# Patient Record
(demographics unavailable — no encounter records)

---

## 2025-05-29 NOTE — PHYSICAL EXAM
[Antalgic] : antalgic [de-identified] : General: No acute distress Mental: Alert and oriented x3 Eyes: Conjunctivitis not seen Chest: Symmetric chest rise, no audible wheezing Skin: Bilateral lower extremities absent from rashes and ulcers Abdomen: No distention  Right hip: Skin: Clean, dry and intact Inspection: No obvious deformity, no swelling, no ecchymosis. Tenderness: no tenderness over greater trochanter/gluteus medius insertion. No tenderness pubic symphysis, pubic tubercle, hip flexors. No tenderness ischial tuberosity or buttock. Nontender over the ASIS/Illiac crest. ROM: 0-120. Internal rotation 20, external rotation 40 Special tests: Positive Stinchfield, positive FADIR, positive PAGE, negative logroll Additional tests: No pain with circumduction, negative impingement test at 90 Strength: 5/5 hip flexion/Abduction/Q/H/TA/GS/EHL Neuro: Sensation intact to light touch throughout in dp/sp/tib/brandon/saph distributions Pulses: 2+ DP/PT pulses [de-identified] : Pelvis and right hip x-ray shows severe joint space narrowing, asymmetry and mild deformity of the femoral head, subchondral sclerosis and subchondral cyst, small osteophytes.  MRI right hip on 5/22/2025 at [facility] reviewed: Avascular necrosis and marrow edema at the femoral head and neck.

## 2025-05-29 NOTE — HISTORY OF PRESENT ILLNESS
[de-identified] : 78-year-old male with approximately 2 months of right hip pain.  Denies any injuries.  Reports medial groin pain radiating to the thigh and knee. The pain substantially limits activities of daily living. Walking tolerance is reduced. Medication including NSAIDs such as meloxicam, and activity modification have been minimally effective. Assistive devices and external support were not deemed by the patient to be helpful in improving their function.  He started physical therapy which caused more pain.  The patient denies any radiation of the pain to the feet and it is not associated with numbness, tingling, or weakness.  He was previously prescribed meloxicam.  He smokes 1 pack of cigarettes per day for many years. PMH: CAD with stents, hypertension, hyperlipidemia, extensive smoking history

## 2025-05-29 NOTE — DISCUSSION/SUMMARY
[de-identified] : The patient presents with severe RIGHT hip osteoarthritis and avascular necrosis. Based upon the patient's continued symptoms and failure to respond to conservative treatment, I have recommended a RIGHT total hip replacement for the patient. An extensive discussion was conducted of the natural history of the arthritis and the variety of surgical and non-surgical treatment options available to the patient. A risk/benefit analysis was discussed with the patient reviewing the advantages and disadvantages of surgical intervention at this time. One or more of the following conservative treatments have been tried and failed for 3 months or more: analgesic medication; home exercises; physical therapy; brace; cortisone injections; anti-inflammatory medication. Both the level and length of the patient's pain have made additional conservative treatment measures contraindicated. A full explanation was given of the nature and the purpose of the procedure and anesthesia, its benefits, possible alternative methods of diagnosis or treatment, the risks involved, the possibility of complications, the foreseeable consequences of the procedure and the possible results of the non-treatment.    The patient has arthritis/ end stage joint disease of the hip supported by x-ray that demonstrated at least one of the following: periarticular osteophytes; severe joint space narrowing; subchondral cysts; subchondral sclerosis; bone on bone articulation.   A discussion was had with the patient regarding a total hip replacement. Anterior and posterior exposures were discussed. For this patient an Anterior approach is appropriate. A model was used to demonstrate the operation and to discuss bearing surfaces of the implants, as well as implant choices and fixation with shared decision making. The hospitalization and rehabilitation were discussed. The use of perioperative antibiotics and DVT prophylaxis were discussed. No guarantee or assurance was made as to the results that may be obtained. The risks, benefits and alternatives to surgical intervention were discussed at length with the patient. The potential biologic and mechanical risks of the procedure were discussed. Specifically the risks were identified to include, but are not limited to the following: Infection, phlebitis, DVT, pulmonary embolism, death, paralysis, dislocation, pain, stiffness, instability, limp, weakness, breakage, leg-length inequality, uncontrolled bleeding, nerve injury, blood vessel injury, pressure sores, anesthetic risks, delayed healing of wound and bone, wear and loosening, and need for additional surgery.  The patient was told that we will take steps to minimize these risks by using sterile technique, antibiotics and DVT prophylaxis when appropriate and following the patient postoperatively in the clinic setting to monitor progress. The benefits of surgery were discussed with the patient including the potential to improve the current clinical condition through operative intervention. Alternatives to surgical intervention include continued conservative management which may yield less than optimal results in this particular patient. Further discussion was undertaken with the patient about the details of surgical preparation, treatment, and postoperative rehabilitation including medical clearance, autotransfusion, the hospital course, and the postoperative rehabilitation involved. Postoperative hospital time is important to observe for urinary retention, neurologic decline, cardiopulmonary issues, and signs of blood clot which are frequent early complications of joint replacements. This time will also be used to work with PT so they are safe to navigate without falling which could lead to fracture and more surgery. All questions were answered to the patient's satisfaction. The patient was given my packet of additional information about the procedure.  This patient smokes cigarettes about 1 pack/day for several years.  I explained to the patient the risk of nicotine exposure around the time of joint arthroplasty, which is well documented in the orthopedic literature for increasing risks of wound breakdown (dehiscence), periprosthetic joint infection, dissatisfaction, chronic pain, and impaired overall outcome to the patient. The patient understands our nicotine cessation policy and will be required to stop smoking 1 month before surgery and this will be continued at minimum 1 month after surgery. This includes all cigarettes, cigars, chewing tobacco, patches and gums which contain nicotine. The patient may be tested for nicotine in addition prior to undergoing joint arthroplasty.   Clearance: PCP, cardiology, dental Diclofenac prescribed today, he will discuss with his cardiologist